# Patient Record
Sex: MALE | Race: OTHER | NOT HISPANIC OR LATINO | Employment: FULL TIME | URBAN - METROPOLITAN AREA
[De-identification: names, ages, dates, MRNs, and addresses within clinical notes are randomized per-mention and may not be internally consistent; named-entity substitution may affect disease eponyms.]

---

## 2021-08-06 ENCOUNTER — APPOINTMENT (OUTPATIENT)
Dept: RADIOLOGY | Facility: MEDICAL CENTER | Age: 36
End: 2021-08-06
Attending: EMERGENCY MEDICINE
Payer: MEDICAID

## 2021-08-06 ENCOUNTER — HOSPITAL ENCOUNTER (EMERGENCY)
Facility: MEDICAL CENTER | Age: 36
End: 2021-08-06
Attending: EMERGENCY MEDICINE
Payer: MEDICAID

## 2021-08-06 VITALS
HEART RATE: 72 BPM | TEMPERATURE: 98 F | WEIGHT: 173 LBS | SYSTOLIC BLOOD PRESSURE: 119 MMHG | RESPIRATION RATE: 15 BRPM | BODY MASS INDEX: 27.8 KG/M2 | HEIGHT: 66 IN | OXYGEN SATURATION: 95 % | DIASTOLIC BLOOD PRESSURE: 83 MMHG

## 2021-08-06 DIAGNOSIS — L03.116 BILATERAL CELLULITIS OF LOWER LEG: ICD-10-CM

## 2021-08-06 DIAGNOSIS — L03.119 CELLULITIS OF FOOT: ICD-10-CM

## 2021-08-06 DIAGNOSIS — L03.115 BILATERAL CELLULITIS OF LOWER LEG: ICD-10-CM

## 2021-08-06 LAB
ALBUMIN SERPL BCP-MCNC: 4.6 G/DL (ref 3.2–4.9)
ALBUMIN/GLOB SERPL: 1.4 G/DL
ALP SERPL-CCNC: 88 U/L (ref 30–99)
ALT SERPL-CCNC: 30 U/L (ref 2–50)
ANION GAP SERPL CALC-SCNC: 11 MMOL/L (ref 7–16)
AST SERPL-CCNC: 20 U/L (ref 12–45)
BASOPHILS # BLD AUTO: 0.3 % (ref 0–1.8)
BASOPHILS # BLD: 0.03 K/UL (ref 0–0.12)
BILIRUB SERPL-MCNC: 0.4 MG/DL (ref 0.1–1.5)
BUN SERPL-MCNC: 12 MG/DL (ref 8–22)
CALCIUM SERPL-MCNC: 9.7 MG/DL (ref 8.5–10.5)
CHLORIDE SERPL-SCNC: 102 MMOL/L (ref 96–112)
CO2 SERPL-SCNC: 26 MMOL/L (ref 20–33)
CREAT SERPL-MCNC: 0.99 MG/DL (ref 0.5–1.4)
CRP SERPL HS-MCNC: <0.3 MG/DL (ref 0–0.75)
EOSINOPHIL # BLD AUTO: 1.43 K/UL (ref 0–0.51)
EOSINOPHIL NFR BLD: 14.9 % (ref 0–6.9)
ERYTHROCYTE [DISTWIDTH] IN BLOOD BY AUTOMATED COUNT: 42.9 FL (ref 35.9–50)
ERYTHROCYTE [SEDIMENTATION RATE] IN BLOOD BY WESTERGREN METHOD: 2 MM/HOUR (ref 0–20)
GLOBULIN SER CALC-MCNC: 3.4 G/DL (ref 1.9–3.5)
GLUCOSE SERPL-MCNC: 93 MG/DL (ref 65–99)
HCT VFR BLD AUTO: 54.7 % (ref 42–52)
HGB BLD-MCNC: 18.6 G/DL (ref 14–18)
IMM GRANULOCYTES # BLD AUTO: 0.05 K/UL (ref 0–0.11)
IMM GRANULOCYTES NFR BLD AUTO: 0.5 % (ref 0–0.9)
LYMPHOCYTES # BLD AUTO: 2.46 K/UL (ref 1–4.8)
LYMPHOCYTES NFR BLD: 25.7 % (ref 22–41)
MCH RBC QN AUTO: 30.8 PG (ref 27–33)
MCHC RBC AUTO-ENTMCNC: 34 G/DL (ref 33.7–35.3)
MCV RBC AUTO: 90.7 FL (ref 81.4–97.8)
MONOCYTES # BLD AUTO: 0.43 K/UL (ref 0–0.85)
MONOCYTES NFR BLD AUTO: 4.5 % (ref 0–13.4)
NEUTROPHILS # BLD AUTO: 5.18 K/UL (ref 1.82–7.42)
NEUTROPHILS NFR BLD: 54.1 % (ref 44–72)
NRBC # BLD AUTO: 0 K/UL
NRBC BLD-RTO: 0 /100 WBC
PLATELET # BLD AUTO: 245 K/UL (ref 164–446)
PMV BLD AUTO: 9.8 FL (ref 9–12.9)
POTASSIUM SERPL-SCNC: 4.4 MMOL/L (ref 3.6–5.5)
PROT SERPL-MCNC: 8 G/DL (ref 6–8.2)
RBC # BLD AUTO: 6.03 M/UL (ref 4.7–6.1)
SODIUM SERPL-SCNC: 139 MMOL/L (ref 135–145)
WBC # BLD AUTO: 9.6 K/UL (ref 4.8–10.8)

## 2021-08-06 PROCEDURE — 87205 SMEAR GRAM STAIN: CPT

## 2021-08-06 PROCEDURE — 73620 X-RAY EXAM OF FOOT: CPT | Mod: LT

## 2021-08-06 PROCEDURE — 96374 THER/PROPH/DIAG INJ IV PUSH: CPT

## 2021-08-06 PROCEDURE — 86140 C-REACTIVE PROTEIN: CPT

## 2021-08-06 PROCEDURE — 700111 HCHG RX REV CODE 636 W/ 250 OVERRIDE (IP): Performed by: EMERGENCY MEDICINE

## 2021-08-06 PROCEDURE — 87070 CULTURE OTHR SPECIMN AEROBIC: CPT

## 2021-08-06 PROCEDURE — 87077 CULTURE AEROBIC IDENTIFY: CPT

## 2021-08-06 PROCEDURE — 73620 X-RAY EXAM OF FOOT: CPT | Mod: RT

## 2021-08-06 PROCEDURE — 99284 EMERGENCY DEPT VISIT MOD MDM: CPT

## 2021-08-06 PROCEDURE — 87040 BLOOD CULTURE FOR BACTERIA: CPT

## 2021-08-06 PROCEDURE — 87186 SC STD MICRODIL/AGAR DIL: CPT

## 2021-08-06 PROCEDURE — 85025 COMPLETE CBC W/AUTO DIFF WBC: CPT

## 2021-08-06 PROCEDURE — 96375 TX/PRO/DX INJ NEW DRUG ADDON: CPT

## 2021-08-06 PROCEDURE — 80053 COMPREHEN METABOLIC PANEL: CPT

## 2021-08-06 PROCEDURE — 85652 RBC SED RATE AUTOMATED: CPT

## 2021-08-06 RX ORDER — AMOXICILLIN AND CLAVULANATE POTASSIUM 875; 125 MG/1; MG/1
1 TABLET, FILM COATED ORAL 2 TIMES DAILY
Qty: 20 TABLET | Refills: 0 | Status: SHIPPED | OUTPATIENT
Start: 2021-08-06 | End: 2021-08-16

## 2021-08-06 RX ORDER — CEPHALEXIN 500 MG/1
500 CAPSULE ORAL 4 TIMES DAILY
Status: SHIPPED | COMMUNITY
End: 2021-08-06

## 2021-08-06 RX ORDER — DOXYCYCLINE 100 MG/1
100 CAPSULE ORAL 2 TIMES DAILY
Qty: 20 CAPSULE | Refills: 0 | Status: SHIPPED | OUTPATIENT
Start: 2021-08-06 | End: 2021-08-10

## 2021-08-06 RX ORDER — DIPHENHYDRAMINE HYDROCHLORIDE 50 MG/ML
25 INJECTION INTRAMUSCULAR; INTRAVENOUS ONCE
Status: COMPLETED | OUTPATIENT
Start: 2021-08-06 | End: 2021-08-06

## 2021-08-06 RX ORDER — SULFAMETHOXAZOLE AND TRIMETHOPRIM 800; 160 MG/1; MG/1
1 TABLET ORAL 2 TIMES DAILY
Status: SHIPPED | COMMUNITY
End: 2021-08-06

## 2021-08-06 RX ORDER — HYDROCODONE BITARTRATE AND ACETAMINOPHEN 5; 325 MG/1; MG/1
1 TABLET ORAL EVERY 8 HOURS PRN
COMMUNITY

## 2021-08-06 RX ORDER — FLUCONAZOLE 150 MG/1
150 TABLET ORAL DAILY
Status: SHIPPED | COMMUNITY
End: 2021-08-10

## 2021-08-06 RX ORDER — DEXAMETHASONE SODIUM PHOSPHATE 4 MG/ML
4 INJECTION, SOLUTION INTRA-ARTICULAR; INTRALESIONAL; INTRAMUSCULAR; INTRAVENOUS; SOFT TISSUE ONCE
Status: COMPLETED | OUTPATIENT
Start: 2021-08-06 | End: 2021-08-06

## 2021-08-06 RX ADMIN — DIPHENHYDRAMINE HYDROCHLORIDE 25 MG: 50 INJECTION INTRAMUSCULAR; INTRAVENOUS at 15:56

## 2021-08-06 RX ADMIN — DEXAMETHASONE SODIUM PHOSPHATE 4 MG: 4 INJECTION, SOLUTION INTRA-ARTICULAR; INTRALESIONAL; INTRAMUSCULAR; INTRAVENOUS; SOFT TISSUE at 15:56

## 2021-08-06 NOTE — ED PROVIDER NOTES
ED Provider Note    CHIEF COMPLAINT  Chief Complaint   Patient presents with   • Wound Check   • Open Wound   • Foot Problem     itching x 3 weeks x 1 week ago used antifungal spray and cream, woke with worsening sores and drainage       HPI  Sridhar Hansen is a 36 y.o. male who presents for evaluation of progressively severe wounds to his bilateral feet.  This began as severe itching of his feet with some minor areas of skin breakdown between his toes that he assumed was a case of athlete's foot.  He applied over-the-counter antifungal medication about 1 week ago and immediately afterwards had significant redness and some blistering and progressive discomfort in his feet.  He then went to urgent care where he was prescribed an oral antifungal medication.  Since then his symptoms have continued to worsen, increasing redness, increasing swelling and discomfort with more wounds that seem to be oozing.  He then went to the urgent care at [facility] a couple days ago, he was prescribed Keflex and Bactrim and states that his symptoms continue to worsen, he now has blisters on the bottom of his feet.  Today he woke up with generalized itching and a different rash all over his body so he comes here for further evaluation and care.  He has no medical problems.  He has never had anything like this in the past.  He reports that he generally takes great care of himself, he pays careful attention to his foot hygiene.  No fever.  No vomiting or chest pain or shortness of breath, he has no other specific complaints.    REVIEW OF SYSTEMS  Negative for fever, chest pain, dyspnea, abdominal pain, nausea, vomiting, diarrhea, headache, focal weakness, focal numbness, focal tingling, back pain. All other systems are negative.     PAST MEDICAL HISTORY  History reviewed. No pertinent past medical history.    FAMILY HISTORY  History reviewed. No pertinent family history.    SOCIAL HISTORY  Social History     Tobacco Use   • Smoking  "status: Never Smoker   • Smokeless tobacco: Never Used   Vaping Use   • Vaping Use: Never used   Substance Use Topics   • Alcohol use: Not Currently   • Drug use: Never       SURGICAL HISTORY  History reviewed. No pertinent surgical history.    CURRENT MEDICATIONS  I personally reviewed the medication list in the charting documentation.     ALLERGIES  No Known Allergies    MEDICAL RECORD  I have reviewed patient's medical record and pertinent results are listed above.      PHYSICAL EXAM  VITAL SIGNS: /101   Pulse 80   Temp 36.6 °C (97.9 °F) (Temporal)   Resp 15   Ht 1.676 m (5' 6\")   Wt 78.5 kg (173 lb)   SpO2 97%   BMI 27.92 kg/m²    Constitutional: Well appearing patient in no acute distress.  Awake and alert, not toxic nor ill in appearance.  HENT: Normocephalic, no obvious evidence of acute trauma.  Eyes: No scleral icterus. Normal conjunctiva   Neck: Comfortable movement without any obvious restriction in the range of motion.  Cardiovascular: Upon ascultation I appreciate a regular heart rhythm and a normal rate.   Thorax & Lungs: Normal nonlabored respirations.  Upon application of the stethoscope for auscultation I find there to be no associated chest wall tenderness.  I appreciate no wheezing, rhonchi or rales. There is normal air movement.    Abdomen: The abdomen is not visibly distended. Upon palpation, I find it to be without tenderness.  No mass appreciated.  Skin: Inspection of the skin reveals nonspecific maculopapular areas of erythema involving the legs, chest and arms  Extremities/Musculoskeletal: Inspection of the feet reveals bilateral symmetric edema, erythema and desquamating skin with multiple weeping open areas.  The area between the toes is particularly more severely affected.  Neurovascularly intact with good cap refill and sensation.  Blistering to the plantar surface of both feet.  This involves both ankles and extends slightly more proximal into the lower legs.  No " crepitation palpation.  Neurologic: Alert & oriented. No focal deficits observed.   Psychiatric: Normal affect appropriate for the clinical situation.    DIAGNOSTIC STUDIES / PROCEDURES    LABS/EKGs  Results for orders placed or performed during the hospital encounter of 08/06/21   CBC WITH DIFFERENTIAL   Result Value Ref Range    WBC 9.6 4.8 - 10.8 K/uL    RBC 6.03 4.70 - 6.10 M/uL    Hemoglobin 18.6 (H) 14.0 - 18.0 g/dL    Hematocrit 54.7 (H) 42.0 - 52.0 %    MCV 90.7 81.4 - 97.8 fL    MCH 30.8 27.0 - 33.0 pg    MCHC 34.0 33.7 - 35.3 g/dL    RDW 42.9 35.9 - 50.0 fL    Platelet Count 245 164 - 446 K/uL    MPV 9.8 9.0 - 12.9 fL    Neutrophils-Polys 54.10 44.00 - 72.00 %    Lymphocytes 25.70 22.00 - 41.00 %    Monocytes 4.50 0.00 - 13.40 %    Eosinophils 14.90 (H) 0.00 - 6.90 %    Basophils 0.30 0.00 - 1.80 %    Immature Granulocytes 0.50 0.00 - 0.90 %    Nucleated RBC 0.00 /100 WBC    Neutrophils (Absolute) 5.18 1.82 - 7.42 K/uL    Lymphs (Absolute) 2.46 1.00 - 4.80 K/uL    Monos (Absolute) 0.43 0.00 - 0.85 K/uL    Eos (Absolute) 1.43 (H) 0.00 - 0.51 K/uL    Baso (Absolute) 0.03 0.00 - 0.12 K/uL    Immature Granulocytes (abs) 0.05 0.00 - 0.11 K/uL    NRBC (Absolute) 0.00 K/uL   COMP METABOLIC PANEL   Result Value Ref Range    Sodium 139 135 - 145 mmol/L    Potassium 4.4 3.6 - 5.5 mmol/L    Chloride 102 96 - 112 mmol/L    Co2 26 20 - 33 mmol/L    Anion Gap 11.0 7.0 - 16.0    Glucose 93 65 - 99 mg/dL    Bun 12 8 - 22 mg/dL    Creatinine 0.99 0.50 - 1.40 mg/dL    Calcium 9.7 8.5 - 10.5 mg/dL    AST(SGOT) 20 12 - 45 U/L    ALT(SGPT) 30 2 - 50 U/L    Alkaline Phosphatase 88 30 - 99 U/L    Total Bilirubin 0.4 0.1 - 1.5 mg/dL    Albumin 4.6 3.2 - 4.9 g/dL    Total Protein 8.0 6.0 - 8.2 g/dL    Globulin 3.4 1.9 - 3.5 g/dL    A-G Ratio 1.4 g/dL   CRP QUANTITIVE (NON-CARDIAC)   Result Value Ref Range    Stat C-Reactive Protein <0.30 0.00 - 0.75 mg/dL   Sed Rate   Result Value Ref Range    Sed Rate Westergren 2 0 - 20 mm/hour    ESTIMATED GFR   Result Value Ref Range    GFR If African American >60 >60 mL/min/1.73 m 2    GFR If Non African American >60 >60 mL/min/1.73 m 2        RADIOLOGY  DX-FOOT-2- LEFT   Final Result      No acute osseous abnormality.      DX-FOOT-2- RIGHT   Final Result      No acute osseous abnormality.            COURSE & MEDICAL DECISION MAKING  I have reviewed any medical record information, laboratory studies and radiographic results as noted above.  Differential diagnoses includes: Cellulitis, sepsis, outpatient antibiotic failure, dehydration, electrolyte abnormalities, anemia    Encounter Summary: This is a very pleasant 36 y.o. male who unfortunately required evaluation in the emergency department today with progressively severe swelling and drainage of his feet bilaterally.  For couple weeks he had intense itching, this progressed to swelling, redness and open wounds over the past week.  Has been on oral Diflucan as well as Keflex and Bactrim with multiple physician visits in the past week, continues to worsen.  Today developed a generalized rash.  Examination of his feet concerning for cellulitis with multiple open wounds, drainage and some blistering on the plantar surface.  Neurovascularly intact.  The rash on his rest of his body is different, I suspect is a reaction to either the Keflex or Bactrim which has been on now for less than 24 hours.  He is otherwise healthy, his initial vital signs are within normal limits other than some hypertension.  Will obtain x-rays, cultures and blood work, will reevaluate ------- this evaluation is extremely reassuring.  WBC is normal.  Sed rate is normal.  CRP is normal.  X-rays reveal no acute abnormalities, no subcutaneous gas noted.  At this point this is not a deep space necrotizing infection.  I long discussion with multiple her clinical pharmacist, we discussed the possibility of Pseudomonas infection, seems rather unlikely and otherwise healthy nondiabetic.  We  discussed strep and staph coverage, we also discussed his reaction that he had presumably to 1 of these medications.  We decided to switch him completely from Keflex and Bactrim to Augmentin and doxycycline.  Additionally I contacted our community health worker who was able to get an appointment next Friday at the wound clinic.  Here in the emergency department he did receive a dose of Decadron and Benadryl, he is being given very strict return instructions regarding the signs and symptoms that would prompt immediate return to the emergency department which included but are not limited to fever, pain, spreading redness or inability to take his antibiotics.  The patient and his wife at the bedside expressed understanding.  At this point is being discharged home in stable condition with strict return instructions provided.      DISPOSITION: Discharged home in stable condition      FINAL IMPRESSION  1. Cellulitis of foot    2. Bilateral cellulitis of lower leg           This dictation was created using voice recognition software. The accuracy of the dictation is limited to the abilities of the software. I expect there may be some errors of grammar and possibly content. The nursing notes were reviewed and certain aspects of this information were incorporated into this note.    Electronically signed by: Rhett Souza M.D., 8/6/2021 1:00 PM

## 2021-08-07 LAB
GRAM STN SPEC: NORMAL
SIGNIFICANT IND 70042: NORMAL
SITE SITE: NORMAL
SOURCE SOURCE: NORMAL

## 2021-08-08 LAB
BACTERIA WND AEROBE CULT: ABNORMAL
BACTERIA WND AEROBE CULT: ABNORMAL
GRAM STN SPEC: ABNORMAL
SIGNIFICANT IND 70042: ABNORMAL
SITE SITE: ABNORMAL
SOURCE SOURCE: ABNORMAL

## 2021-08-10 ENCOUNTER — OFFICE VISIT (OUTPATIENT)
Dept: WOUND CARE | Facility: MEDICAL CENTER | Age: 36
End: 2021-08-10
Attending: EMERGENCY MEDICINE
Payer: MEDICAID

## 2021-08-10 VITALS
RESPIRATION RATE: 17 BRPM | OXYGEN SATURATION: 100 % | HEART RATE: 63 BPM | TEMPERATURE: 97.1 F | DIASTOLIC BLOOD PRESSURE: 85 MMHG | SYSTOLIC BLOOD PRESSURE: 126 MMHG

## 2021-08-10 DIAGNOSIS — L08.9 FOOT INFECTION: ICD-10-CM

## 2021-08-10 PROCEDURE — 99213 OFFICE O/P EST LOW 20 MIN: CPT

## 2021-08-10 PROCEDURE — 99213 OFFICE O/P EST LOW 20 MIN: CPT | Performed by: NURSE PRACTITIONER

## 2021-08-10 ASSESSMENT — ENCOUNTER SYMPTOMS
VOMITING: 0
CHILLS: 0
NAUSEA: 0
DIARRHEA: 0
CONSTIPATION: 0
PALPITATIONS: 0
FEVER: 0
SHORTNESS OF BREATH: 0
COUGH: 0

## 2021-08-10 NOTE — PROGRESS NOTES
Provider Encounter- Full Thickness wound    HISTORY OF PRESENT ILLNESS  Wound History:    START OF CARE IN CLINIC: 8/10/2021    REFERRING PROVIDER: Rhett Souza M.D.     WOUND- Full Thickness Wound   LOCATION: Bilateral feet       HISTORY:36-year-old male referred to [facility] for wounds to bilateral feet. This began as severe itching of his feet with some minor areas of skin breakdown between his toes that he assumed was a case of athlete's foot.  He applied over-the-counter antifungal medication, Lotrimin, after which he developed significant redness and blistering with progressive discomfort.  He presented to an urgent care where he was prescribed an oral antifungal medication.  His symptoms continued to worsen, with increased redness, swelling, and oozing wounds.  Went to urgent care at [facility] where he was prescribed Keflex and Bactrim, which did not help.  Developed blisters to the bottoms of his feet, and presented to [facility] ED.  In addition to his foot wound, he developed a generalized rash to the rest of his body.  Sensitivity to either Keflex or Bactrim a suspected, he was instructed to discontinue.  Additionally he was given a dose of Decadron and Benadryl.  He was prescribed Augmentin and doxycycline, later instructed to stop doxycycline as well.  He was also prescribed Diflucan, which he took once, then 3 days later, and then weekly for 3 to 4 weeks.      Pertinent Medical History: No significant PMH    TOBACCO USE: He has never smoked or use smokeless tobacco    Patient's problem list, allergies, and current medications reviewed and updated in Epic    Interval History:  8/10/2021 Initial clinic visit with IVET Hernández, KIRAN-BC, CWBLAKEN, CFLINN.  Patient presents today with no open wounds to his feet.  His wounds appear to be resolving, no open wounds noted in clinic today.  He still has a bit of a rash to his arms and torso.  States he took Benadryl with no effect.  Currently only taking Augmentin  and Diflucan.  Rash developed prior to starting Augmentin.   Discharge from [facility].  Patient struck to return to clinic ASAP if wound reopen.      REVIEW OF SYSTEMS:   Review of Systems   Constitutional: Negative for chills and fever.   Respiratory: Negative for cough and shortness of breath.    Cardiovascular: Negative for chest pain and palpitations.   Gastrointestinal: Negative for constipation, diarrhea, nausea and vomiting.   Skin: Positive for itching and rash.        Itchy rash to arms and torso.  Suspected antibiotic allergy.  Patient has since discontinued Keflex and Bactrim       PHYSICAL EXAMINATION:   /85 (BP Location: Right arm, Patient Position: Sitting)   Pulse 63   Temp 36.2 °C (97.1 °F) (Temporal)   Resp 17   SpO2 100%     Physical Exam  Constitutional:       Appearance: He is normal weight.   HENT:      Head: Normocephalic.   Eyes:      Pupils: Pupils are equal, round, and reactive to light.   Cardiovascular:      Rate and Rhythm: Normal rate.      Pulses: Normal pulses.      Comments: Pedal pulses palpable, 2+ bilaterally  Pulmonary:      Effort: Pulmonary effort is normal.   Musculoskeletal:         General: Normal range of motion.   Skin:     General: Skin is warm.      Comments: Dry flaking skin to plantar forefoot laterally  No open wounds to feet     Neurological:      General: No focal deficit present.      Mental Status: He is alert and oriented to person, place, and time.   Psychiatric:         Mood and Affect: Mood normal.         Behavior: Behavior normal.         Thought Content: Thought content normal.         Judgment: Judgment normal.         WOUND ASSESSMENT                    PROCEDURE:   No need for debridement or wound care today      Pertinent Labs and Diagnostics:    Labs:     A1c: No results found for: HBA1C       [id number]  TECHNIQUE/EXAM DESCRIPTION AND NUMBER OF VIEWS:  2 views of the LEFT foot.     COMPARISON:  None.     FINDINGS:  There is no fracture or  dislocation.  The visualized osseous structures are in anatomic alignment.  The joint spaces are preserved.  Bone mineralization is age-appropriate..    TECHNIQUE/EXAM DESCRIPTION AND NUMBER OF VIEWS:  2 views of the RIGHT foot.     COMPARISON:  None.     FINDINGS:  There is no fracture or dislocation.  The visualized osseous structures are in anatomic alignment.  The joint spaces are preserved.  Bone mineralization is age-appropriate..        IMPRESSION:     No acute osseous abnormality.    VASCULAR STUDIES: N/A    LAST  WOUND CULTURE:  DATE : None found in epic         ASSESSMENT AND PLAN:     1. Foot infection    8/10/2021: Patient apparently developed fungal infection to both feet, with underlying bacterial infection as well.  This appears to be resolving, he has no open wounds.  Still on Augmentin and Diflucan.  -Discharge from [facility]  -Patient to return to clinic ASAP if wound recurs, or if he/she develops new wounds          PATIENT EDUCATION  - Importance of adequate nutrition for wound healing  -Advised to go to ER for any increased redness, swelling, drainage, or odor, or if patient develops fever, chills, nausea or vomiting.     20min spent face to face with patient, >50% of time spent counseling, coordinating care, reviewing records, discussing POC, educating patient regarding wound healing and progression.        Please note that this note may have been created using voice recognition software. I have worked with technical experts from [employer] to optimize the interface.  I have made every reasonable attempt to correct obvious errors, but there may be errors of grammar and possibly content that I did not discover before finalizing the note.    N

## 2021-08-11 LAB
BACTERIA BLD CULT: NORMAL
BACTERIA BLD CULT: NORMAL
SIGNIFICANT IND 70042: NORMAL
SIGNIFICANT IND 70042: NORMAL
SITE SITE: NORMAL
SITE SITE: NORMAL
SOURCE SOURCE: NORMAL
SOURCE SOURCE: NORMAL

## 2023-12-28 NOTE — ED TRIAGE NOTES
.  Chief Complaint   Patient presents with   • Wound Check   • Open Wound   • Foot Problem     itching x 3 weeks x 1 week ago used antifungal spray and cream, woke with worsening sores and drainage     To triage by w/c. Draining wounds to both feet. Seen at [facility] using antifungal and abx as prescribed without relief.    No